# Patient Record
Sex: MALE | Race: WHITE
[De-identification: names, ages, dates, MRNs, and addresses within clinical notes are randomized per-mention and may not be internally consistent; named-entity substitution may affect disease eponyms.]

---

## 2018-03-21 ENCOUNTER — HOSPITAL ENCOUNTER (OUTPATIENT)
Dept: HOSPITAL 68 - STS | Age: 73
End: 2018-03-21
Attending: SURGERY
Payer: COMMERCIAL

## 2018-03-21 VITALS — WEIGHT: 189 LBS | BODY MASS INDEX: 27.06 KG/M2 | HEIGHT: 70 IN

## 2018-03-21 DIAGNOSIS — E11.9: ICD-10-CM

## 2018-03-21 DIAGNOSIS — I10: ICD-10-CM

## 2018-03-21 DIAGNOSIS — Z79.4: ICD-10-CM

## 2018-03-21 DIAGNOSIS — K40.90: Primary | ICD-10-CM

## 2018-03-21 PROCEDURE — C1781 MESH (IMPLANTABLE): HCPCS

## 2018-03-21 PROCEDURE — C9399 UNCLASSIFIED DRUGS OR BIOLOG: HCPCS

## 2018-03-21 NOTE — OPERATIVE REPORT
Operative/Inv Procedure Report
Surgery Date: 03/21/18
Name of Procedure:
Laparoscopic right inguinal hernia repair
Pre-Operative Diagnosis:
Right inguinal hernia
Post-Operative Diagnosis:
Same
Estimated Blood Loss: scant
Surgeon/Assistant:
Marlon FRANCO,Caleb PUCKETT/Mireille ROSE
 
Anesthesia: general endotracheal tube
Implants:
Parietex mesh
Operative Indication:
72-year-old male with intermittently incarcerating bowel containing hernia 
presents for elective repair
 
Operative/Procedure Note
Note:
After consent patient is brought to the operating room laid supine.  General 
anesthesia was obtained and the abdomen was prepped and draped.  Skin was 
anesthetized with local anesthesia and a transverse infraumbilical incision made
sharply.  We identified the rectus fascia and incised transversely.  Stay 
sutures were placed.  Rectus muscle was retracted laterally and a dissecting 
balloon placed posterior to it.  It was inflated under direct vision the camera 
and replaced with a blunt [place] port.  Gas was instilled.  2, 5 mm ports were 
placed in the infraumbilical midline after local anesthesia was instilled and 
under direct vision and camera.  Began our dissection at the pubis and 
delineated the symphysis.  Rashid's ligament was identified and cleared. Then 
dissected laterally and developed the iliopubic tract.  There was a large 
indirect sac with associated cord lipoma.  These structures were dissected free 
from the cord structures and delivered from the indirect space.  The cord 
structures were circumferentially dissected.  Once the dissection was completed 
a right -sided piece of Parietex mesh was placed in the cavity.  It was placed 
around the cord structures re-create the internal ring and cover the femoral and
direct spaces as well.   Gas was allowed to escape on maintaining proper 
orientation of the mesh.  The fascia was closed with 0 Vicryl suture.  Skin 
incisions closed with 4-0 Vicryl.  Steri-Strips and sterile dressing applied.  
Sponge and needle counts are correct.
Findings:
Large indirect
CC:
Ladonna FRANCO,Clara MARCANO